# Patient Record
(demographics unavailable — no encounter records)

---

## 2025-02-25 NOTE — HISTORY OF PRESENT ILLNESS
[FreeTextEntry1] : This 52-year-old female returns for reevaluation of bilateral knee pain.  She states that the pain is not debilitating at this time.  She has tried 3 anti and amatory medications over time and they each cause gastrointestinal symptomatology.  She has been attempting to lose weight.

## 2025-02-25 NOTE — ASSESSMENT
[FreeTextEntry1] : DJD right and left knees  Patient does not want to attempt anti-inflammatory medications again.  She has been made aware of the possibility of cortisone injections as well as Hyalgan injections.  She will return in 6 months for reevaluation  Encounter time: 21 minutes

## 2025-02-25 NOTE — PHYSICAL EXAM
[FreeTextEntry1] : On physical examination there is crepitus on range of motion of each knee.  There is no effusion in either knee and no varus valgus or AP instability.  She has medial joint line tenderness in each knee.  There is a negative Kelsie's sign bilaterally.

## 2025-02-25 NOTE — DATA REVIEWED
[de-identified] : X-ray evaluation of right and left knees reveal bilateral early degenerative changes with decreased medial joint spaces bilaterally.